# Patient Record
Sex: FEMALE | Race: WHITE | NOT HISPANIC OR LATINO | URBAN - METROPOLITAN AREA
[De-identification: names, ages, dates, MRNs, and addresses within clinical notes are randomized per-mention and may not be internally consistent; named-entity substitution may affect disease eponyms.]

---

## 2024-09-04 ENCOUNTER — TELEPHONE (OUTPATIENT)
Dept: OBSTETRICS AND GYNECOLOGY | Facility: CLINIC | Age: 29
End: 2024-09-04

## 2024-12-02 ENCOUNTER — TELEPHONE (OUTPATIENT)
Dept: OBSTETRICS AND GYNECOLOGY | Facility: CLINIC | Age: 29
End: 2024-12-02

## 2024-12-02 NOTE — TELEPHONE ENCOUNTER
Rcv'd incoming referral for pt.      HUB msg:  PT STATED SHE WAS SEEN AT [facility] ED AND HAD A CT SCAN COMPLETED THAT SHOWED THICKENING OF HER ENDOMETRIAL LINING - PT WAS ADVISED TO FOLLOW UP WITH AN OBGYN WITHIN 2-3 DAYS     May I add her to your schedule on Monday, 12/9/24?  Or do you have a more appropriate date I can schedule her?    Also, I am unable to pull the ED note or CT report from [facility].  Are you able pull it, or do you need for me to request them for you?    Thanks,   Anastasia    Pt # [phone]

## 2024-12-03 NOTE — TELEPHONE ENCOUNTER
12/9/24 is fine, can you schedule for u/s as well please? Can you please request records, I am unable to see as well. Thank you!

## 2024-12-04 ENCOUNTER — TELEPHONE (OUTPATIENT)
Dept: OBSTETRICS AND GYNECOLOGY | Facility: CLINIC | Age: 29
End: 2024-12-04

## 2024-12-04 NOTE — TELEPHONE ENCOUNTER
New TE started.    L/M FOR PT TO CALL TO CONFIRM APPT W/ZULY ARMSTRONG ON 12/9/24 AT 1:15 AT [facility] OFFICE

## 2024-12-04 NOTE — TELEPHONE ENCOUNTER
L/M FOR PT TO CALL TO CONFIRM APPT W/ZULY ARMSTRONG ON 12/9/24 AT 1:15 AT [facility] OFFICE.

## 2024-12-09 ENCOUNTER — OFFICE VISIT (OUTPATIENT)
Dept: OBSTETRICS AND GYNECOLOGY | Facility: CLINIC | Age: 29
End: 2024-12-09
Payer: COMMERCIAL

## 2024-12-09 VITALS
BODY MASS INDEX: 25.58 KG/M2 | DIASTOLIC BLOOD PRESSURE: 62 MMHG | WEIGHT: 139 LBS | SYSTOLIC BLOOD PRESSURE: 107 MMHG | HEIGHT: 62 IN

## 2024-12-09 DIAGNOSIS — Z30.018 ENCOUNTER FOR INITIAL PRESCRIPTION OF OTHER CONTRACEPTIVES: ICD-10-CM

## 2024-12-09 DIAGNOSIS — Z12.4 SCREENING FOR CERVICAL CANCER: ICD-10-CM

## 2024-12-09 DIAGNOSIS — R93.5 ABNORMAL CT SCAN, PELVIS: Primary | ICD-10-CM

## 2024-12-09 PROCEDURE — 1159F MED LIST DOCD IN RCRD: CPT | Performed by: NURSE PRACTITIONER

## 2024-12-09 PROCEDURE — 99204 OFFICE O/P NEW MOD 45 MIN: CPT | Performed by: NURSE PRACTITIONER

## 2024-12-09 PROCEDURE — 1160F RVW MEDS BY RX/DR IN RCRD: CPT | Performed by: NURSE PRACTITIONER

## 2024-12-09 RX ORDER — SERTRALINE HYDROCHLORIDE 25 MG/1
TABLET, FILM COATED ORAL
COMMUNITY
Start: 2024-12-06

## 2024-12-09 RX ORDER — HYDROXYZINE HYDROCHLORIDE 10 MG/1
TABLET, FILM COATED ORAL
COMMUNITY
Start: 2024-12-06

## 2024-12-09 RX ORDER — QUETIAPINE FUMARATE 50 MG/1
TABLET, FILM COATED ORAL
COMMUNITY
Start: 2024-12-06

## 2024-12-09 RX ORDER — SULFAMETHOXAZOLE AND TRIMETHOPRIM 800; 160 MG/1; MG/1
TABLET ORAL
COMMUNITY
Start: 2024-12-07

## 2024-12-09 NOTE — PROGRESS NOTES
"Chief Complaint   Patient presents with    Gynecologic Exam     Pt here today to discuss pelvic CT due to thickened lining       SUBJECTIVE:     Brandy Cardenas is a 29 y.o.  who presents for ED f/u. She was seen in ED for \"kidney issues\" 24. States she was diagnosed with UTI and constipation. She is currently taking an antibiotic for UTI.  A CT scan was completed in ED with impression showing \"Uterus, lower uterine segment, and cervix appear more prominent than expected. Recommend gynecologic consult to ensure against infectious, inflammatory, or neoplastic etiologies.\" She is concerned this could mean she has cervical cancer. Denies any current pelvic pain or vaginal discharge. 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 Denies any prior abnormal pap smears. She is unsure when last pap smear was collected. I am not able to see any pap smear results at this time. No pap smear results found. This is my first time meeting Brandy Cardenas She is new to our office. Prior care with [facility]. Recent rpt c/s in 2024. It sounds like she may have been dismissed due to noncompliance or missed appt.     She would like to discuss IUD placement. She has used IUD in the past. LMP ended several days ago  Past Medical History:   Diagnosis Date    Anxiety     Bipolar disorder     Depression     Substance use disorder       Past Surgical History:   Procedure Laterality Date     SECTION          Review of Systems   Constitutional:  Negative for chills, fatigue and fever.   Gastrointestinal:  Negative for abdominal distention and abdominal pain.   Genitourinary:  Negative for dyspareunia, dysuria, menstrual problem, pelvic pain, vaginal bleeding, vaginal discharge and vaginal pain.       OBJECTIVE:   Vitals:    24 1328   BP: 107/62   Weight: 63 kg (139 lb)   Height: 157.5 cm (62\")        Physical Exam  Constitutional:       General: She is not in acute distress.   "   Appearance: Normal appearance. She is not ill-appearing, toxic-appearing or diaphoretic.   Genitourinary:      Bladder and urethral meatus normal.      No lesions in the vagina.      Right Labia: No rash, tenderness, lesions, skin changes or Bartholin's cyst.     Left Labia: No tenderness, lesions, skin changes, Bartholin's cyst or rash.     No labial fusion noted.      No inguinal adenopathy present in the right or left side.     No vaginal discharge, erythema, tenderness, bleeding, ulceration or granulation tissue.      No vaginal prolapse present.     No vaginal atrophy present.       Right Adnexa: not tender, not full, not palpable, no mass present and not absent.     Left Adnexa: not tender, not full, not palpable, no mass present and not absent.     No cervical motion tenderness, discharge, friability, lesion, polyp, nabothian cyst or eversion.      Uterus is not enlarged, fixed, tender, irregular or prolapsed.      No uterine mass detected.  Abdominal:      General: There is no distension.      Palpations: Abdomen is soft. There is no mass.      Tenderness: There is no abdominal tenderness. There is no guarding.      Hernia: No hernia is present. There is no hernia in the left inguinal area or right inguinal area.   Lymphadenopathy:      Lower Body: No right inguinal adenopathy. No left inguinal adenopathy.   Neurological:      Mental Status: She is alert.   Vitals and nursing note reviewed. Exam conducted with a chaperone present (HALLE Blackburn MA).       Assessment/Plan    Diagnoses and all orders for this visit:    1. Abnormal CT scan, pelvis (Primary)  -     Chlamydia trachomatis, Neisseria gonorrhoeae, Trichomonas vaginalis, PCR - Swab, Cervix  -     Cancel: US Non-ob Transvaginal; Future    2. Screening for cervical cancer  -     IGP, Apt HPV,rfx 16 / 18,45    Discussed CT scan results, vague findings, prefer to complete TVUS today to further evaluate uterus, ET, ovaries.  Pap smear obtained today  Vaginal  cultures to R/O infection  Normal TVUS today-results reviewed with pt in detail  Discussed IUD in detail. Reviewed uses, side effects, risks vs benefits. Advised to call with next menses    Return in 1 year (on 12/9/2025) for Annual physical, BELGICA Torres.    I spent 45 minutes caring for Brandy on this date of service. This time includes time spent by me in the following activities: preparing for the visit, reviewing tests, performing a medically appropriate examination and/or evaluation, counseling and educating the patient/family/caregiver, referring and communicating with other health care professionals, documenting information in the medical record, independently interpreting results and communicating that information with the patient/family/caregiver, care coordination, ordering test(s), obtaining a separately obtained history, and reviewing a separately obtained history    BELGICA Boone  12/9/2024  13:56 EST

## 2024-12-10 LAB
C TRACH RRNA SPEC QL NAA+PROBE: NEGATIVE
N GONORRHOEA RRNA SPEC QL NAA+PROBE: NEGATIVE
T VAGINALIS RRNA SPEC QL NAA+PROBE: NEGATIVE

## 2024-12-11 ENCOUNTER — TELEPHONE (OUTPATIENT)
Dept: OBSTETRICS AND GYNECOLOGY | Facility: CLINIC | Age: 29
End: 2024-12-11
Payer: COMMERCIAL

## 2024-12-11 NOTE — TELEPHONE ENCOUNTER
Caller: Peewee Cardenas    Relationship: Self    Best call back number: [phone]      Caller requesting test results: PEEWEE     What test was performed: LABS / US    When was the test performed: 12/09/24    Where was the test performed:     Additional notes: PATIENT IS WANTING TO KNOW IF RESULTS ARE IN AND WOULD LIKE A CALL BACK.

## 2024-12-11 NOTE — TELEPHONE ENCOUNTER
Spoke with Brandy that BELGICA Eldridge said your u/s returned normal We reviewed these results in office the same day. Her vaginal cultures were negative for chlamydia, gonorrhea, and trichomonas. Her pap smear is still pending and often can take 1 week to receive results. Thank you.

## 2024-12-11 NOTE — TELEPHONE ENCOUNTER
Her u/s returned normal We reviewed these results in office the same day. Her vaginal cultures were negative for chlamydia, gonorrhea, and trichomonas. Her pap smear is still pending and often can take 1 week to receive results. Thank you

## 2024-12-13 LAB
CYTOLOGIST CVX/VAG CYTO: NORMAL
CYTOLOGY CVX/VAG DOC CYTO: NORMAL
CYTOLOGY CVX/VAG DOC THIN PREP: NORMAL
DX ICD CODE: NORMAL
HPV I/H RISK 4 DNA CVX QL PROBE+SIG AMP: NEGATIVE
Lab: NORMAL
OTHER STN SPEC: NORMAL
STAT OF ADQ CVX/VAG CYTO-IMP: NORMAL

## 2024-12-16 ENCOUNTER — TELEPHONE (OUTPATIENT)
Dept: OBSTETRICS AND GYNECOLOGY | Facility: CLINIC | Age: 29
End: 2024-12-16
Payer: COMMERCIAL

## 2024-12-16 NOTE — TELEPHONE ENCOUNTER
----- Message from Kerry Hanley sent at 12/16/2024  9:44 AM EST -----  Please let the pt know that her vaginal cultures and pap smear returned normal. Thank you